# Patient Record
Sex: FEMALE | Race: NATIVE HAWAIIAN OR OTHER PACIFIC ISLANDER | HISPANIC OR LATINO | URBAN - METROPOLITAN AREA
[De-identification: names, ages, dates, MRNs, and addresses within clinical notes are randomized per-mention and may not be internally consistent; named-entity substitution may affect disease eponyms.]

---

## 2020-09-16 ENCOUNTER — EMERGENCY (EMERGENCY)
Facility: HOSPITAL | Age: 30
LOS: 1 days | Discharge: HOME | End: 2020-09-16
Attending: EMERGENCY MEDICINE
Payer: COMMERCIAL

## 2020-09-16 VITALS
WEIGHT: 233.03 LBS | RESPIRATION RATE: 18 BRPM | OXYGEN SATURATION: 95 % | TEMPERATURE: 100 F | DIASTOLIC BLOOD PRESSURE: 85 MMHG | SYSTOLIC BLOOD PRESSURE: 125 MMHG | HEIGHT: 61.42 IN | HEART RATE: 76 BPM

## 2020-09-16 PROCEDURE — 96374 THER/PROPH/DIAG INJ IV PUSH: CPT

## 2020-09-16 PROCEDURE — T1013: CPT

## 2020-09-16 PROCEDURE — 99283 EMERGENCY DEPT VISIT LOW MDM: CPT | Mod: 25

## 2020-09-16 PROCEDURE — 96375 TX/PRO/DX INJ NEW DRUG ADDON: CPT

## 2020-09-16 PROCEDURE — 96372 THER/PROPH/DIAG INJ SC/IM: CPT | Mod: XU

## 2020-09-16 PROCEDURE — 99284 EMERGENCY DEPT VISIT MOD MDM: CPT

## 2020-09-16 PROCEDURE — 99284 EMERGENCY DEPT VISIT MOD MDM: CPT | Mod: 25

## 2020-09-16 RX ORDER — FAMOTIDINE 10 MG/ML
20 INJECTION INTRAVENOUS ONCE
Refills: 0 | Status: COMPLETED | OUTPATIENT
Start: 2020-09-16 | End: 2020-09-16

## 2020-09-16 RX ORDER — DIPHENHYDRAMINE HCL 50 MG
25 CAPSULE ORAL ONCE
Refills: 0 | Status: COMPLETED | OUTPATIENT
Start: 2020-09-16 | End: 2020-09-16

## 2020-09-16 RX ORDER — DIPHENHYDRAMINE HCL 50 MG
50 CAPSULE ORAL ONCE
Refills: 0 | Status: COMPLETED | OUTPATIENT
Start: 2020-09-16 | End: 2020-09-16

## 2020-09-16 RX ADMIN — Medication 50 MILLIGRAM(S): at 22:50

## 2020-09-16 RX ADMIN — FAMOTIDINE 20 MILLIGRAM(S): 10 INJECTION INTRAVENOUS at 21:35

## 2020-09-16 RX ADMIN — Medication 60 MILLIGRAM(S): at 21:35

## 2020-09-16 RX ADMIN — Medication 30 MILLILITER(S): at 22:50

## 2020-09-16 RX ADMIN — Medication 25 MILLIGRAM(S): at 21:35

## 2020-09-16 NOTE — ED ADULT NURSE NOTE - NSIMPLEMENTINTERV_GEN_ALL_ED
Gabapentin called into pharmacy 9/4/19
Pt is requesting refill  .   Requested Prescriptions     Pending Prescriptions Disp Refills    gabapentin (NEURONTIN) 100 mg capsule [Pharmacy Med Name: GABAPENTIN 100MG CAPSULES] 90 Cap 0     Sig: TAKE 1 CAPSULE BY MOUTH EVERY NIGHT 2 HOURS BEFORE BEDTIME    pravastatin (PRAVACHOL) 20 mg tablet [Pharmacy Med Name: PRAVASTATIN 20MG TABLETS] 90 Tab 0     Sig: TAKE 1 TABLET BY MOUTH EVERY EVENING    alendronate (FOSAMAX) 35 mg tablet 4 Tab 3     Pharmacy verified
Implemented All Universal Safety Interventions:  [place] to call system. Call bell, personal items and telephone within reach. Instruct patient to call for assistance. Room bathroom lighting operational. Non-slip footwear when patient is off stretcher. Physically safe environment: no spills, clutter or unnecessary equipment. Stretcher in lowest position, wheels locked, appropriate side rails in place.

## 2020-09-16 NOTE — ED ADULT TRIAGE NOTE - CHIEF COMPLAINT QUOTE
as per patient, she started having nausea, dizziness, diarrhea and body rash and itching. Pt with erythema and hives to abdomen. denies throat itching or swelling, denies difficulty breathing.

## 2020-09-16 NOTE — ED PROVIDER NOTE - ATTENDING CONTRIBUTION TO CARE
Pt. awake and alert. Airway intact. Lungs CTA b/l. Skin-Diffuse rash noted to chest/abdomen/back. Hives appearing to area of skin noted. Pt. in no distress. I, Dr. Puri, performed a face to face bedside interview with this patient regarding history of present illness, review of symptoms and relevant past medical, social and family history.  I completed an independent physical examination.  I have also reviewed the ACP's note(s) and discussed the plan with the ACP.

## 2020-09-16 NOTE — ED PROVIDER NOTE - PATIENT PORTAL LINK FT
You can access the FollowMyHealth Patient Portal offered by [facility] by registering at the following website: [email or web address]. By joining [employer]’s FollowMyHealth portal, you will also be able to view your health information using other applications (apps) compatible with our system.

## 2020-09-16 NOTE — ED PROVIDER NOTE - CLINICAL SUMMARY MEDICAL DECISION MAKING FREE TEXT BOX
31 yo female PMHx hypothyroidism presents to ED c/o urticaria x1 hour. No known allergies. No new food/detergents. No airway involvements, lungs CTAB. Plan for meds, reassess, allergist follow up.

## 2020-09-16 NOTE — ED PROVIDER NOTE - NSFOLLOWUPINSTRUCTIONS_ED_ALL_ED_FT
- Prescription sent to pharmacy.  - Benadryl 25-50mg every 6-8 hours for at least 2 days.  - Pepcid 20mg daily for at least 2 days.  - Please bring all documentation from your ED visit to any related future follow up appointment.  - Please call to schedule follow up appointment with your primary care physician within 24-48 hours.  - Please seek immediate medical attention for any new/worsening, signs/symptoms, or concerns.    Feel better!       - Receta enviada a farmacia.  - Benadryl 25-50 mg cada 6-8 horas jeffry al menos 2 días.  - Pepcid 20 mg al día jeffry al menos 2 días.  - Traiga toda la documentación de powell visita al servicio de urgencias a cualquier sofie de seguimiento futura relacionada.  - Llame para programar victoria sofie de seguimiento con powell médico de atención primaria dentro de las 24 a 48 horas.  - Busque atención médica inmediata ante cualquier nuevo / empeoramiento, signos / síntomas o inquietudes.    ¡Sentirse mejor!

## 2020-09-16 NOTE — ED PROVIDER NOTE - OBJECTIVE STATEMENT
Regian. 29 yo female PMHx hypothyroidism presents to ED c/o rash x1 hour. Self medicated with "allergy medication" (doesn't know name) without relief. Patient last ate rice, chicken, canned corn and peas which is normal diet for patient. Patient had three episodes of diarrhea today. No further complaints at this time.   Denies allergies, new foods/detergents, dizziness, oropharyngeal swelling, difficulty breathing, shortness of breathe, n/v.

## 2020-09-16 NOTE — ED PROVIDER NOTE - CARE PROVIDER_API CALL
Adis Lopez [id number] [place] [address]  Phone: [phone]  Fax: [phone]  Follow Up Time:

## 2020-09-16 NOTE — ED PROVIDER NOTE - PHYSICAL EXAMINATION
General: A&Ox3, in NAD, non-toxic appearing; well nourished/developed. Phonation normal.  Skin: Warm, dry, color normal for race. Diffuse urticaria to anterior/posterior trunk and neck. Single urticaria to b/l arms and legs.   Eyes: Sclera anicteric, conjunctivae clear b/l. No discharge. PERRLA, EOMI.  Throat: Airway patent. Tolerating secretions, no drooling or trismus. No lip or tongue swelling. No oorpharygneral swelling.   Neck: Supple.   Cardio: No lifts, heaves, visible pulsations. No thrills. Rate and rhythm regular, S1 & S2 clear. No audible murmur, gallop, or rub.  Resp: Speaking in full sentences. No visible nasal flaring, retractions, or deformity. Normal AP to lateral diameter, symmetrical excursion b/l. Breath sounds vesicular, symmetrical and without rales, rhonchi or wheezing b/l.  Abd: Non-distended. Soft, non-tender, no masses palpated. No rebound, guarding.

## 2020-09-16 NOTE — ED PROVIDER NOTE - CROS ED RESP ALL NEG
Skin Complaint HPI





- HPI Summary


HPI Summary: 





Pt presents to  with mom. Pt healthy without recent sickness. Today mom 

brought to school - child washed hands. Shortly after noted to have edema and 

swelling, hives and itching  to bilateral hands and forearm.  Mom got call from 

school RN. Sx have nearly resolved since mom picked up. no facial swelling, 

lesions. no shortness of breath. no other complaints. Mom denies new foods 

cloths, detergents. Pt withotu any complaints. no treatment given 





immunizaitons UTD


medications reviewed





- History of Current Complaint


Chief Complaint: UCSkin


Time Seen by Provider: 04/24/19 12:04


Stated Complaint: BILATERAL HAND/LEFT CHEEK - SKIN


Hx Obtained From: Patient


Pregnant?: No


Onset/Duration: Gradual Onset


Pain Intensity: 0





- Allergy/Home Medications


Allergies/Adverse Reactions: 


 Allergies











Allergy/AdvReac Type Severity Reaction Status Date / Time


 


No Known Allergies Allergy   Verified 04/24/19 11:43














PMH/Surg Hx/FS Hx/Imm Hx


Previously Healthy: Yes





- Surgical History


Surgical History: None





- Family History


Known Family History: Positive: Cardiac Disease, Non-Contributory





- Social History


Occupation: Student


Lives: With Family


Alcohol Use: None


Smoking Status (MU): Never Smoked Tobacco





- Immunization History


Vaccination Up to Date: Yes





Review of Systems


All Other Systems Reviewed And Are Negative: Yes


Constitutional: Positive: Negative


Skin: Positive: Rash





Physical Exam





- Summary


Physical Exam Summary: 





Vital Signs Reviewed: Yes


A+Ox3, no distress, age appropriate


Eyes: Conjunctiva Clear, WILL. EOM intact and full


ENT: Hearing grossly normal  TM x 2 clear, mmoist, uvula midline, no exudate, 

no erythema


Neck: Positive: Supple


Respiratory: Positive: No respiratory distress, No accessory muscle use + CTA 

throughout  no w/r


Cardiovascular: RRR  nl s1, s2  no m/r  CBT <2  sec


abd soft + BS nt/nd  no guarding, no distension


Musculoskeletal Exam: JACOBSON x 4 without difficulty Strength Intact, ROM Intact


Neurological: Positive: Alert,  + sensation throughout


Psychological: Positive: Normal Response To Family


Skin: Positive: no hives mild erythema to fingers ob bilateral hands. minimal 

edema. no hives. no itching. nolesions elsewhere - mom reports near resolution


Triage Information Reviewed: Yes


Vital Signs: 


 Initial Vital Signs











Temp  99.4 F   04/24/19 11:43


 


Pulse  119   04/24/19 11:43


 


Resp  22   04/24/19 11:43


 


BP  104/45   04/24/19 11:43


 


Pulse Ox  98   04/24/19 11:43














Course/Dx





- Course


Course Of Treatment: 





Pt presents after developing hives, swelling and reddness to bilateral hands 

and midforearm at school this am after washing hands. No tx renderned - nearly 

resolved


VSS


pt well appearing, minimal erythema, edema fingers b/l 


I had long conversation with mom - unclear cause - sx improving


recommend cold soaks


reviewed benadryl dosing as required 


strict return precautions


no treatment at present


mom comfortable and in agreement with plan - will watch closely at home today





- Diagnoses


Provider Diagnosis: 


 Localized swelling on hand








Discharge





- Sign-Out/Discharge


Documenting (check all that apply): Patient Departure


All imaging exams completed and their final reports reviewed: No Studies





- Discharge Plan


Condition: Stable


Disposition: HOME


Patient Education Materials:  Acute Rash (ED)


Referrals: 


Chitra Zazueta MD [Primary Care Provider] - 


Additional Instructions: 


The doctor that evaluated you today is not sure the cause of your rash - it may 

be related to something you touched. 


The following is recommended: 


- avoid getting over heated over the next 24 hours (hot water, showers, running)


- If your symptoms return or worsen - okay to take Benadryl 12.5mg 


- If you have any changes in your breathing rapid progression of reddness, 

swelling, hives,s welling or  any other concerns it is recommended you go to 

the emergency department for reassessment 








- Billing Disposition and Condition


Condition: STABLE


Disposition: Home
negative...

## 2020-09-17 ENCOUNTER — EMERGENCY (EMERGENCY)
Facility: HOSPITAL | Age: 30
LOS: 1 days | Discharge: HOME | End: 2020-09-17
Attending: EMERGENCY MEDICINE
Payer: COMMERCIAL

## 2020-09-17 VITALS
HEART RATE: 95 BPM | OXYGEN SATURATION: 96 % | HEIGHT: 61.42 IN | DIASTOLIC BLOOD PRESSURE: 75 MMHG | WEIGHT: 233.03 LBS | SYSTOLIC BLOOD PRESSURE: 121 MMHG | TEMPERATURE: 98 F | RESPIRATION RATE: 18 BRPM

## 2020-09-17 VITALS
HEART RATE: 106 BPM | OXYGEN SATURATION: 99 % | SYSTOLIC BLOOD PRESSURE: 140 MMHG | DIASTOLIC BLOOD PRESSURE: 62 MMHG | RESPIRATION RATE: 18 BRPM

## 2020-09-17 PROCEDURE — 96372 THER/PROPH/DIAG INJ SC/IM: CPT

## 2020-09-17 PROCEDURE — 99284 EMERGENCY DEPT VISIT MOD MDM: CPT | Mod: 25

## 2020-09-17 PROCEDURE — 99284 EMERGENCY DEPT VISIT MOD MDM: CPT

## 2020-09-17 PROCEDURE — T1013: CPT

## 2020-09-17 RX ORDER — DIPHENHYDRAMINE HCL 50 MG
50 CAPSULE ORAL ONCE
Refills: 0 | Status: COMPLETED | OUTPATIENT
Start: 2020-09-17 | End: 2020-09-17

## 2020-09-17 RX ORDER — EPINEPHRINE 0.3 MG/.3ML
0.3 INJECTION INTRAMUSCULAR; SUBCUTANEOUS
Qty: 1 | Refills: 0
Start: 2020-09-17 | End: 2020-09-17

## 2020-09-17 RX ORDER — FAMOTIDINE 10 MG/ML
20 INJECTION INTRAVENOUS DAILY
Refills: 0 | Status: DISCONTINUED | OUTPATIENT
Start: 2020-09-17 | End: 2020-09-22

## 2020-09-17 RX ORDER — EPINEPHRINE 0.3 MG/.3ML
0.3 INJECTION INTRAMUSCULAR; SUBCUTANEOUS ONCE
Refills: 0 | Status: COMPLETED | OUTPATIENT
Start: 2020-09-17 | End: 2020-09-17

## 2020-09-17 RX ORDER — EPINEPHRINE 0.3 MG/.3ML
1 INJECTION INTRAMUSCULAR; SUBCUTANEOUS
Qty: 1 | Refills: 0
Start: 2020-09-17

## 2020-09-17 RX ORDER — HYDROXYZINE HCL 10 MG
25 TABLET ORAL ONCE
Refills: 0 | Status: COMPLETED | OUTPATIENT
Start: 2020-09-17 | End: 2020-09-17

## 2020-09-17 RX ADMIN — Medication 25 MILLIGRAM(S): at 21:51

## 2020-09-17 RX ADMIN — EPINEPHRINE 0.3 MILLIGRAM(S): 0.3 INJECTION INTRAMUSCULAR; SUBCUTANEOUS at 17:18

## 2020-09-17 RX ADMIN — Medication 50 MILLIGRAM(S): at 18:34

## 2020-09-17 RX ADMIN — FAMOTIDINE 20 MILLIGRAM(S): 10 INJECTION INTRAVENOUS at 17:18

## 2020-09-17 NOTE — ED PROVIDER NOTE - PROGRESS NOTE DETAILS
pts rash has improved pt is no longer itchy, feels much better will dc home with her to continue regimen and will prescribe an epi pen for home.

## 2020-09-17 NOTE — ED ADULT NURSE REASSESSMENT NOTE - NS ED NURSE REASSESS COMMENT FT1
Assumed care from previous RN. Plan of care reviewed. C/o rash of unknown cause. Airway intact, no swelling noted. Pink, raised generalized rash noted. Pt alert and oriented x3, respirations even and unlabored, skin warm and dry, color appropriate for ethnicity, speech clear, moving extremities. Remains on cm and . Updated pt on plan of care. Will continue to monitor.

## 2020-09-17 NOTE — ED PROVIDER NOTE - ENMT, MLM
Airway patent, Nasal mucosa clear. Mouth with normal mucosa. Throat has no vesicles, no oropharyngeal exudates and uvula is midline. No lip or throat swelling. No drooling.

## 2020-09-17 NOTE — ED PROVIDER NOTE - CLINICAL SUMMARY MEDICAL DECISION MAKING FREE TEXT BOX
31 y/o F with worsening urticaria, diarrhea and c/o slow breathing but CTA b/l. Pepcid 20 mg given, Epi 0.3 mg IM given. cardiac bedside monitor. will observe for 4 hours.

## 2020-09-17 NOTE — ED PROVIDER NOTE - NSFOLLOWUPINSTRUCTIONS_ED_ALL_ED_FT
Allergic Reaction    An allergic reaction is an abnormal reaction to a substance (allergen) by the body's defense system. Common allergens include medicines, food, insect bites or stings, and blood products. The body releases certain proteins into the blood that can cause a variety of symptoms such as an itchy rash, wheezing, swelling of the face/lips/tongue/throat, abdominal pain, nausea or vomiting. An allergic reaction is usually treated with medication. If your health care provider prescribed you an epinephrine injection device, make sure to keep it with you at all times.    SEEK IMMEDIATE MEDICAL CARE IF YOU HAVE ANY OF THE FOLLOWING SYMPTOMS: allergic reaction severe enough that required you to use epinephrine, tightness in your chest, swelling around your lips/tongue/throat, abdominal pain, vomiting or diarrhea, or lightheadedness/dizziness. These symptoms may represent a serious problem that is an emergency. Do not wait to see if the symptoms will go away. Use your auto-injector pen or anaphylaxis kit as you have been instructed. Call 911 and do not drive yourself to the hospital.   Continue with Benadryl Allergic Reaction    An allergic reaction is an abnormal reaction to a substance (allergen) by the body's defense system. Common allergens include medicines, food, insect bites or stings, and blood products. The body releases certain proteins into the blood that can cause a variety of symptoms such as an itchy rash, wheezing, swelling of the face/lips/tongue/throat, abdominal pain, nausea or vomiting. An allergic reaction is usually treated with medication. If your health care provider prescribed you an epinephrine injection device, make sure to keep it with you at all times.    SEEK IMMEDIATE MEDICAL CARE IF YOU HAVE ANY OF THE FOLLOWING SYMPTOMS: allergic reaction severe enough that required you to use epinephrine, tightness in your chest, swelling around your lips/tongue/throat, abdominal pain, vomiting or diarrhea, or lightheadedness/dizziness. These symptoms may represent a serious problem that is an emergency. Do not wait to see if the symptoms will go away. Use your auto-injector pen or anaphylaxis kit as you have been instructed. Call 911 and do not drive yourself to the hospital.     Continue with Benadryl 25 - 50 mg every 6 to 8 hours x 3 days  Continue with Pepcid 20 mg a day x 3 days  Continue Prednisone 40 mg x 3 days or until you finish prescribed dose amount  Follow up with allergist Dr Adis Lopez

## 2020-09-17 NOTE — ED ADULT NURSE NOTE - CHIEF COMPLAINT QUOTE
pt with c/o rash, itching, feeling hot flushed to chest since yesterday. took benadryl 50mg this morning and again around 2pm with no improvement.

## 2020-09-17 NOTE — ED PROVIDER NOTE - ATTENDING CONTRIBUTION TO CARE
Dc: I performed a face to face bedside interview with patient regarding history of present illness, review of symptoms and past medical history. I completed an independent physical exam.  I have discussed patient's plan of care with advanced care provider.   I agree with note as stated above including HISTORY OF PRESENT ILLNESS, HIV, PAST MEDICAL/SURGICAL/FAMILY/SOCIAL HISTORY, ALLERGIES AND HOME MEDICATIONS, REVIEW OF SYSTEMS, PHYSICAL EXAM, MEDICAL DECISION MAKING and any PROGRESS NOTES during the time I functioned as the attending physician for this patient  unless otherwise noted. My brief assessment is as follows: 30F p/w pruritic rash. Seen yesterday and started on prednisonce, benadryl, pepcid for allergic reaction. States that the rash is getting worse. +diarrhea. Denies new exposures. Denies SOB, mouth/tongue swelling, CP.   Exam notable for diffuse urticaria. No oral edema, no stridor, no wheezing, no abd ttp.   Patient already on prednisone/benadryl/pepcid. Will give epinephrine IM, benadryl prn, and reassess.

## 2020-09-17 NOTE — ED PROVIDER NOTE - PATIENT PORTAL LINK FT
You can access the FollowMyHealth Patient Portal offered by [facility] by registering at the following website: [email or web address]. By joining [employer]’s FollowMyHealth portal, you will also be able to view your health information using other applications (apps) compatible with our system.

## 2020-09-17 NOTE — ED PROVIDER NOTE - RESPIRATORY, MLM
Breath sounds clear and equal bilaterally. No stridor. speaking in full sentences, not gasping no accessory muscles usage

## 2020-09-17 NOTE — ED ADULT TRIAGE NOTE - CHIEF COMPLAINT QUOTE
pt with c/o rash, itching, feeling hot flushed to chest since yesterday. took benadryl 50mg this morning and again around 2pm with no improvement.
No/received vaccine 2017

## 2020-09-17 NOTE — ED PROVIDER NOTE - CARE PROVIDER_API CALL
Adis Lopez [id number] [place] [address]  Phone: [phone]  Fax: [phone]  Follow Up Time: 1-3 Days

## 2020-09-17 NOTE — ED PROVIDER NOTE - OBJECTIVE STATEMENT
31 y/o F with pmhx of hypothyroidism presents to ED with worsening allergic reaction. Pt was here yesterday for rash of unknown origin and was treated with Maalox, Benadryl, pepcid and prednisone. Pt states she felt better when leaving but this morning rash has worsened and experiencing diarrhea and nausea since yesterday.  Pt states she took the prescribed Prednisone 40mg at 2 pm and Benadryl 50 mg at 2 pm with no relief and rash worsening. Pt states her breathing feelings slow. Denies chest pain, lip or throat swelling, vomiting. Denies new food, clothes, bedding, perfume, soaps, detergents, exposure to animals. Lisha used as

## 2020-09-18 ENCOUNTER — EMERGENCY (EMERGENCY)
Facility: HOSPITAL | Age: 30
LOS: 1 days | Discharge: HOME | End: 2020-09-18
Attending: STUDENT IN AN ORGANIZED HEALTH CARE EDUCATION/TRAINING PROGRAM
Payer: COMMERCIAL

## 2020-09-18 VITALS
HEART RATE: 99 BPM | DIASTOLIC BLOOD PRESSURE: 65 MMHG | SYSTOLIC BLOOD PRESSURE: 132 MMHG | OXYGEN SATURATION: 98 % | RESPIRATION RATE: 16 BRPM

## 2020-09-18 VITALS
WEIGHT: 233.03 LBS | SYSTOLIC BLOOD PRESSURE: 128 MMHG | DIASTOLIC BLOOD PRESSURE: 88 MMHG | HEART RATE: 111 BPM | HEIGHT: 61.42 IN | TEMPERATURE: 98 F | RESPIRATION RATE: 20 BRPM | OXYGEN SATURATION: 96 %

## 2020-09-18 PROCEDURE — 99284 EMERGENCY DEPT VISIT MOD MDM: CPT

## 2020-09-18 PROCEDURE — T1013: CPT

## 2020-09-18 RX ORDER — FAMOTIDINE 10 MG/ML
20 INJECTION INTRAVENOUS ONCE
Refills: 0 | Status: COMPLETED | OUTPATIENT
Start: 2020-09-18 | End: 2020-09-18

## 2020-09-18 RX ORDER — MONTELUKAST 4 MG/1
10 TABLET, CHEWABLE ORAL ONCE
Refills: 0 | Status: COMPLETED | OUTPATIENT
Start: 2020-09-18 | End: 2020-09-18

## 2020-09-18 RX ORDER — SODIUM CHLORIDE 9 MG/ML
1000 INJECTION INTRAMUSCULAR; INTRAVENOUS; SUBCUTANEOUS ONCE
Refills: 0 | Status: COMPLETED | OUTPATIENT
Start: 2020-09-18 | End: 2020-09-18

## 2020-09-18 RX ORDER — DIPHENHYDRAMINE HCL 50 MG
50 CAPSULE ORAL ONCE
Refills: 0 | Status: COMPLETED | OUTPATIENT
Start: 2020-09-18 | End: 2020-09-18

## 2020-09-18 RX ORDER — FAMOTIDINE 10 MG/ML
1 INJECTION INTRAVENOUS
Qty: 7 | Refills: 0
Start: 2020-09-18 | End: 2020-09-24

## 2020-09-18 RX ORDER — DIPHENHYDRAMINE HCL 50 MG
1 CAPSULE ORAL
Qty: 30 | Refills: 0
Start: 2020-09-18 | End: 2020-09-22

## 2020-09-18 RX ORDER — HYDROXYZINE HCL 10 MG
25 TABLET ORAL ONCE
Refills: 0 | Status: COMPLETED | OUTPATIENT
Start: 2020-09-18 | End: 2020-09-18

## 2020-09-18 RX ORDER — EPINEPHRINE 0.3 MG/.3ML
0.3 INJECTION INTRAMUSCULAR; SUBCUTANEOUS ONCE
Refills: 0 | Status: COMPLETED | OUTPATIENT
Start: 2020-09-18 | End: 2020-09-18

## 2020-09-18 RX ADMIN — MONTELUKAST 10 MILLIGRAM(S): 4 TABLET, CHEWABLE ORAL at 17:52

## 2020-09-18 RX ADMIN — Medication 25 MILLIGRAM(S): at 17:51

## 2020-09-18 RX ADMIN — SODIUM CHLORIDE 1000 MILLILITER(S): 9 INJECTION INTRAMUSCULAR; INTRAVENOUS; SUBCUTANEOUS at 16:04

## 2020-09-18 RX ADMIN — FAMOTIDINE 20 MILLIGRAM(S): 10 INJECTION INTRAVENOUS at 16:04

## 2020-09-18 RX ADMIN — Medication 50 MILLIGRAM(S): at 16:04

## 2020-09-18 RX ADMIN — EPINEPHRINE 0.3 MILLIGRAM(S): 0.3 INJECTION INTRAMUSCULAR; SUBCUTANEOUS at 18:56

## 2020-09-18 RX ADMIN — Medication 60 MILLIGRAM(S): at 16:04

## 2020-09-18 NOTE — ED STATDOCS - PROGRESS NOTE DETAILS
NP NOTE:  Charting reviewed, patient taking medications correctly, given doses here without any relief of itch or rash.  Not having any difficulty breathing or swallowing.  Will give atarax and singulair, reevaluate. NP NOTE:  No improvement in diffuse hive rash or itch.  Will give epinephrine and monitor. TAHIR Love: Received sign out from ROXANA Lara pending observing s/p epi and re-assessment. Pt noting slight improvement of symptoms at this time, rash still present diffusely. No s/s anaphylaxis. TAHIR Love: Received sign out from ROXANA Lara pending observing s/p epi and re-assessment. Pt noting slight improvement of symptoms at this time, rash still present diffusely. No s/s anaphylaxis.  : Regina Siddiqui Pt observed for >3 hours in ED, no worsening of symptoms. VSS, well appearing. pt given rx for new medications and referral info for derm. Return precautions given. Stable for dc  : Regina Siddiqui

## 2020-09-18 NOTE — ED STATDOCS - PATIENT PORTAL LINK FT
You can access the FollowMyHealth Patient Portal offered by [facility] by registering at the following website: [email or web address]. By joining [employer]’s FollowMyHealth portal, you will also be able to view your health information using other applications (apps) compatible with our system.

## 2020-09-18 NOTE — ED STATDOCS - ATTENDING CONTRIBUTION TO CARE
30 yof pmh hypothyroid day 3 of urticaria. Unclear allergen. third visit here to ED. taking steroids and benadryl as prescribed. no uvular swelling or airway involvement. diffuse urticaria throughout body. will treat as allergic rxn. monitor and reassess

## 2020-09-18 NOTE — ED STATDOCS - OBJECTIVE STATEMENT
29 y/o F pt with significant PMHx of hypothyroidism presents to the ED c/o rash throughout her body that worsened this morning after taking Benadryl. Has been here for the past 2 days with same sx. Reports her tongue is hurting today. Believes she is having an allergic reaction. Not aware of current allergies; never had an allergic rxn before a couple days ago. States that the current rash is worse than her previous allergic reactions. Has been taking a steroid 1x per day without significant relief. Had epinephrine here last time. Takes thyroid meds but denies other meds. Denies smoking. Denies surgeries. No further complaints at this time. 31 y/o F pt with significant PMHx of hypothyroidism presents to the ED c/o rash throughout her body that worsened this morning after taking Benadryl. Has been here for the past 2 days with same sx. Reports her tongue is hurting today. Believes she is having an allergic reaction. Not aware of current allergies; never had an allergic rxn before a couple days ago. States that the current rash is worse than her previous allergic reactions. Has been taking a steroid 1x per day without significant relief, benadryl and pepcid. Had epinephrine here last time. Takes thyroid meds but denies other meds. Denies smoking. Denies surgeries. No further complaints at this time.

## 2020-09-18 NOTE — ED STATDOCS - CARE PROVIDER_API CALL
Cristina Maddox  DERMATOLOGY  [address] [place] [address]  Phone: [phone]  Fax: [phone]  Follow Up Time:     Lakeisha Rouse  DERMATOLOGY  [address] [facility] [address]  [place] [address]  Phone: [phone]  Fax: [phone]  Follow Up Time:     Adis Lopez [facility]  [address] [place] [address]  Phone: [phone]  Fax: [phone]  Follow Up Time:

## 2020-09-18 NOTE — ED STATDOCS - ENMT, MLM
Nasal mucosa clear.  Mouth with normal mucosa  Throat has no vesicles, no oropharyngeal exudates and uvula is midline. No uvular swelling and no tonsillar swelling.

## 2020-09-18 NOTE — ED STATDOCS - PROVIDER TOKENS
PROVIDER:[TOKEN:[[id number]]],PROVIDER:[TOKEN:[[id number]]],PROVIDER:[TOKEN:[[id number]]]

## 2020-09-18 NOTE — ED STATDOCS - NSFOLLOWUPINSTRUCTIONS_ED_ALL_ED_FT
- Follow up with your doctor within 2-3 days.   - Return to the ED for any new or worsening symptoms.   - Follow-up with dermatologist or allergist within 1 week for further evaluation  - Continue current medications for rash/itching    Rash    A rash is a change in the color of the skin. A rash can also change the way your skin feels. There are many different conditions and factors that can cause a rash, most of which are not dangerous. Make sure to follow up with your primary care physician or a dermatologist as instructed by your health care provider.    SEEK IMMEDIATE MEDICAL CARE IF YOU HAVE ANY OF THE FOLLOWING SYMPTOMS: fever, blisters, a rash inside your mouth, vaginal or anal pain, or altered mental status.     - Amaya un seguimiento con powell médico dentro de 2-3 días.  - Regrese al servicio de urgencias por cualquier síntoma nuevo o que empeore.  - Seguimiento con dermatólogo o alergólogo dentro de 1 semana para violeta evaluación adicional  - Continuar con los medicamentos actuales para el sarpullido / picazón    Erupción    Violeta erupción es un cambio en el color de la piel. Violeta erupción también puede cambiar la forma en que se siente la piel. Hay muchas condiciones y factores diferentes que pueden causar violeta erupción, la mayoría de los cuales no son peligrosos. Asegúrese de hacer un seguimiento con powell médico de atención primaria o un dermatólogo según las instrucciones de powell proveedor de atención médica.    BUSQUE ATENCIÓN MÉDICA INMEDIATA SI TIENE ALGUNO DE LOS SIGUIENTES SÍNTOMAS: fiebre, ampollas, sarpullido dentro de la boca, dolor vaginal o anal o alteración del estado mental.

## 2020-09-18 NOTE — ED STATDOCS - CLINICAL SUMMARY MEDICAL DECISION MAKING FREE TEXT BOX
3rd visit in 3 days for recurrent diffuse body urticaria. Has been taking steroids. No airway involvement. Will treat with Benadryl, Pepcid, monitor, and reassess. 3rd visit in 3 days for recurrent diffuse body urticaria. Has been taking steroids. No airway involvement. patient with epipen at home. unclear allergen, has not been able to see allergist yet. Will treat with Benadryl, Pepcid, monitor, and reassess.

## 2020-09-18 NOTE — ED ADULT TRIAGE NOTE - CHIEF COMPLAINT QUOTE
Pt arrives to ED c/o generalized body hives from unknown allergen, symtoms started three days ago  c/o slight itchiness in  the throat , breathing easy and unlabored

## 2020-09-18 NOTE — ED ADULT NURSE REASSESSMENT NOTE - NS ED NURSE REASSESS COMMENT FT1
Assumed pt. care at 1930. Pt. A&ox3. Pt. respirations even and unlabored. Pt. in no apparent distress. Pt. has hives on her body. Pt. states the itchiness has not gotten better. Pt. denies any respiratory complications.

## 2020-09-18 NOTE — ED ADULT NURSE NOTE - OBJECTIVE STATEMENT
Pt arrives with hives to total body that she states are itchy. Pt used a new cleaning product to wash pots at home and this happened afterwards. Pt denies any difficulty breathing, throat tightness, diff swallowing.

## 2020-09-19 ENCOUNTER — APPOINTMENT (OUTPATIENT)
Dept: DERMATOLOGY | Facility: CLINIC | Age: 30
End: 2020-09-19
Payer: COMMERCIAL

## 2020-09-19 PROBLEM — E03.9 HYPOTHYROIDISM, UNSPECIFIED: Chronic | Status: ACTIVE | Noted: 2020-09-16

## 2020-09-19 PROCEDURE — 99202 OFFICE O/P NEW SF 15 MIN: CPT

## 2020-09-20 ENCOUNTER — EMERGENCY (EMERGENCY)
Facility: HOSPITAL | Age: 30
LOS: 1 days | Discharge: HOME | End: 2020-09-20
Attending: EMERGENCY MEDICINE
Payer: COMMERCIAL

## 2020-09-20 VITALS
SYSTOLIC BLOOD PRESSURE: 115 MMHG | DIASTOLIC BLOOD PRESSURE: 78 MMHG | RESPIRATION RATE: 20 BRPM | HEIGHT: 61.42 IN | HEART RATE: 100 BPM | OXYGEN SATURATION: 98 % | TEMPERATURE: 98 F | WEIGHT: 229.94 LBS

## 2020-09-20 VITALS
RESPIRATION RATE: 22 BRPM | DIASTOLIC BLOOD PRESSURE: 55 MMHG | OXYGEN SATURATION: 93 % | HEART RATE: 110 BPM | SYSTOLIC BLOOD PRESSURE: 112 MMHG

## 2020-09-20 LAB
ALBUMIN SERPL ELPH-MCNC: 3.7 G/DL — SIGNIFICANT CHANGE UP (ref 3.3–5.2)
ALP SERPL-CCNC: 61 U/L — SIGNIFICANT CHANGE UP (ref 40–120)
ALT FLD-CCNC: 17 U/L — SIGNIFICANT CHANGE UP
ANION GAP SERPL CALC-SCNC: 12 MMOL/L — SIGNIFICANT CHANGE UP (ref 5–17)
AST SERPL-CCNC: 39 U/L — HIGH
BASOPHILS # BLD AUTO: 0.05 K/UL — SIGNIFICANT CHANGE UP (ref 0–0.2)
BASOPHILS NFR BLD AUTO: 0.3 % — SIGNIFICANT CHANGE UP (ref 0–2)
BILIRUB SERPL-MCNC: 0.6 MG/DL — SIGNIFICANT CHANGE UP (ref 0.4–2)
BUN SERPL-MCNC: 6 MG/DL — LOW (ref 8–20)
CALCIUM SERPL-MCNC: 8.9 MG/DL — SIGNIFICANT CHANGE UP (ref 8.6–10.2)
CHLORIDE SERPL-SCNC: 101 MMOL/L — SIGNIFICANT CHANGE UP (ref 98–107)
CK SERPL-CCNC: 106 U/L — SIGNIFICANT CHANGE UP (ref 25–170)
CO2 SERPL-SCNC: 24 MMOL/L — SIGNIFICANT CHANGE UP (ref 22–29)
CREAT SERPL-MCNC: 0.55 MG/DL — SIGNIFICANT CHANGE UP (ref 0.5–1.3)
EOSINOPHIL # BLD AUTO: 0.02 K/UL — SIGNIFICANT CHANGE UP (ref 0–0.5)
EOSINOPHIL NFR BLD AUTO: 0.1 % — SIGNIFICANT CHANGE UP (ref 0–6)
GLUCOSE SERPL-MCNC: 130 MG/DL — HIGH (ref 70–99)
HCG SERPL-ACNC: <4 MIU/ML — SIGNIFICANT CHANGE UP
HCT VFR BLD CALC: 44.5 % — SIGNIFICANT CHANGE UP (ref 34.5–45)
HGB BLD-MCNC: 14.9 G/DL — SIGNIFICANT CHANGE UP (ref 11.5–15.5)
IMM GRANULOCYTES NFR BLD AUTO: 0.7 % — SIGNIFICANT CHANGE UP (ref 0–1.5)
LYMPHOCYTES # BLD AUTO: 1.11 K/UL — SIGNIFICANT CHANGE UP (ref 1–3.3)
LYMPHOCYTES # BLD AUTO: 6.6 % — LOW (ref 13–44)
MCHC RBC-ENTMCNC: 30.5 PG — SIGNIFICANT CHANGE UP (ref 27–34)
MCHC RBC-ENTMCNC: 33.5 GM/DL — SIGNIFICANT CHANGE UP (ref 32–36)
MCV RBC AUTO: 91.2 FL — SIGNIFICANT CHANGE UP (ref 80–100)
MONOCYTES # BLD AUTO: 0.27 K/UL — SIGNIFICANT CHANGE UP (ref 0–0.9)
MONOCYTES NFR BLD AUTO: 1.6 % — LOW (ref 2–14)
NEUTROPHILS # BLD AUTO: 15.36 K/UL — HIGH (ref 1.8–7.4)
NEUTROPHILS NFR BLD AUTO: 90.7 % — HIGH (ref 43–77)
PLATELET # BLD AUTO: 296 K/UL — SIGNIFICANT CHANGE UP (ref 150–400)
POTASSIUM SERPL-MCNC: 5.2 MMOL/L — SIGNIFICANT CHANGE UP (ref 3.5–5.3)
POTASSIUM SERPL-SCNC: 5.2 MMOL/L — SIGNIFICANT CHANGE UP (ref 3.5–5.3)
PROT SERPL-MCNC: 7.2 G/DL — SIGNIFICANT CHANGE UP (ref 6.6–8.7)
RBC # BLD: 4.88 M/UL — SIGNIFICANT CHANGE UP (ref 3.8–5.2)
RBC # FLD: 14 % — SIGNIFICANT CHANGE UP (ref 10.3–14.5)
SODIUM SERPL-SCNC: 137 MMOL/L — SIGNIFICANT CHANGE UP (ref 135–145)
TROPONIN T SERPL-MCNC: <0.01 NG/ML — SIGNIFICANT CHANGE UP (ref 0–0.06)
WBC # BLD: 16.92 K/UL — HIGH (ref 3.8–10.5)
WBC # FLD AUTO: 16.92 K/UL — HIGH (ref 3.8–10.5)

## 2020-09-20 PROCEDURE — 82550 ASSAY OF CK (CPK): CPT

## 2020-09-20 PROCEDURE — 96361 HYDRATE IV INFUSION ADD-ON: CPT

## 2020-09-20 PROCEDURE — 36415 COLL VENOUS BLD VENIPUNCTURE: CPT

## 2020-09-20 PROCEDURE — 99284 EMERGENCY DEPT VISIT MOD MDM: CPT | Mod: 25

## 2020-09-20 PROCEDURE — 84702 CHORIONIC GONADOTROPIN TEST: CPT

## 2020-09-20 PROCEDURE — 96374 THER/PROPH/DIAG INJ IV PUSH: CPT

## 2020-09-20 PROCEDURE — 71045 X-RAY EXAM CHEST 1 VIEW: CPT

## 2020-09-20 PROCEDURE — 96375 TX/PRO/DX INJ NEW DRUG ADDON: CPT

## 2020-09-20 PROCEDURE — 93010 ELECTROCARDIOGRAM REPORT: CPT

## 2020-09-20 PROCEDURE — 93005 ELECTROCARDIOGRAM TRACING: CPT

## 2020-09-20 PROCEDURE — 85025 COMPLETE CBC W/AUTO DIFF WBC: CPT

## 2020-09-20 PROCEDURE — 99285 EMERGENCY DEPT VISIT HI MDM: CPT

## 2020-09-20 PROCEDURE — 96372 THER/PROPH/DIAG INJ SC/IM: CPT | Mod: XU

## 2020-09-20 PROCEDURE — 71045 X-RAY EXAM CHEST 1 VIEW: CPT | Mod: 26

## 2020-09-20 PROCEDURE — T1013: CPT

## 2020-09-20 PROCEDURE — 84484 ASSAY OF TROPONIN QUANT: CPT

## 2020-09-20 PROCEDURE — 80053 COMPREHEN METABOLIC PANEL: CPT

## 2020-09-20 RX ORDER — EPINEPHRINE 0.3 MG/.3ML
0.3 INJECTION INTRAMUSCULAR; SUBCUTANEOUS
Qty: 2 | Refills: 0
Start: 2020-09-20

## 2020-09-20 RX ORDER — EPINEPHRINE 0.3 MG/.3ML
0.3 INJECTION INTRAMUSCULAR; SUBCUTANEOUS ONCE
Refills: 0 | Status: COMPLETED | OUTPATIENT
Start: 2020-09-20 | End: 2020-09-20

## 2020-09-20 RX ORDER — FAMOTIDINE 10 MG/ML
20 INJECTION INTRAVENOUS ONCE
Refills: 0 | Status: COMPLETED | OUTPATIENT
Start: 2020-09-20 | End: 2020-09-20

## 2020-09-20 RX ORDER — SODIUM CHLORIDE 9 MG/ML
3000 INJECTION INTRAMUSCULAR; INTRAVENOUS; SUBCUTANEOUS ONCE
Refills: 0 | Status: COMPLETED | OUTPATIENT
Start: 2020-09-20 | End: 2020-09-20

## 2020-09-20 RX ORDER — FAMOTIDINE 10 MG/ML
1 INJECTION INTRAVENOUS
Qty: 28 | Refills: 0
Start: 2020-09-20 | End: 2020-10-03

## 2020-09-20 RX ADMIN — SODIUM CHLORIDE 3000 MILLILITER(S): 9 INJECTION INTRAMUSCULAR; INTRAVENOUS; SUBCUTANEOUS at 11:21

## 2020-09-20 RX ADMIN — EPINEPHRINE 0.3 MILLIGRAM(S): 0.3 INJECTION INTRAMUSCULAR; SUBCUTANEOUS at 09:15

## 2020-09-20 RX ADMIN — Medication 125 MILLIGRAM(S): at 09:15

## 2020-09-20 RX ADMIN — FAMOTIDINE 20 MILLIGRAM(S): 10 INJECTION INTRAVENOUS at 09:15

## 2020-09-20 RX ADMIN — SODIUM CHLORIDE 3000 MILLILITER(S): 9 INJECTION INTRAMUSCULAR; INTRAVENOUS; SUBCUTANEOUS at 09:15

## 2020-09-20 NOTE — ED PROVIDER NOTE - CLINICAL SUMMARY MEDICAL DECISION MAKING FREE TEXT BOX
Patient's  at bedside, educated that this is likely an allergic reaction. Will give Epi, solumedrol Pepcid, IV fluids, and check labs. Pt concerned about CP will do 1 troponin, also will do CXR

## 2020-09-20 NOTE — ED ADULT TRIAGE NOTE - CHIEF COMPLAINT QUOTE
Patient arrived to ED today with c/o chest pain for three hours, throat pain for three hours, and feels like her throat is going numb for three hours. Patient here on the 9/16, 9/17, 9/18 to be seen in ED.  Patient has rash to body. Patient arrived to ED today with c/o chest pain for three hours, throat pain for three hours, and feels like her throat is going numb for three hours. Patient here on the 9/16, 9/17, 9/18 to be seen in ED with similar symptoms.  Patient has rash to body.

## 2020-09-20 NOTE — ED PROVIDER NOTE - OBJECTIVE STATEMENT
31 y/o female with PMHx of Hypothyroidism presents with to ED c/o chest pain. Patient reports diffuse rash all over body for a few days, and new onset chest and throat tightness that started today. Has new prescription of Vitamin D and PMD gave flu shot 10 days ago, 2 days after Vitamiun D was taken, patient developed a rash. The rash is progressively worse, also reports rash worse after taking benadryl. PMD gave steorid taper. Feels more swollen, itchy    No N/V/D, fever, pregnancy  : [place]

## 2020-09-20 NOTE — ED PROVIDER NOTE - SKIN, MLM
diffuse macular papular blanching pleuritic rash, no mucosal involvement, palms and soles with no vesicles. Skin normal color for race, warm, dry and intact

## 2020-09-20 NOTE — ED ADULT NURSE NOTE - CHIEF COMPLAINT QUOTE
Patient arrived to ED today with c/o chest pain for three hours, throat pain for three hours, and feels like her throat is going numb for three hours. Patient here on the 9/16, 9/17, 9/18 to be seen in ED with similar symptoms.  Patient has rash to body.

## 2020-09-20 NOTE — ED PROVIDER NOTE - PROGRESS NOTE DETAILS
pt feels much better, rash on face and overall skin turgor appears better, pt advised to take med,s stop vitamin D

## 2020-09-20 NOTE — ED PROVIDER NOTE - NSFOLLOWUPINSTRUCTIONS_ED_ALL_ED_FT
1. TAKE MEDS AS PRESCRIBED  2. STOP PREDNISONE GIVE  BY YOUR PMD as its different dose  3. stop taking vit D tablets  4. f/u with allergy specialist  5. drink lost fo fluids  6. return promptly in c/o worsening symptoms

## 2020-09-20 NOTE — ED PROVIDER NOTE - PATIENT PORTAL LINK FT
You can access the FollowMyHealth Patient Portal offered by [facility] by registering at the following website: [email or web address]. By joining [employer]’s FollowMyHealth portal, you will also be able to view your health information using other applications (apps) compatible with our system.

## 2020-09-20 NOTE — ED ADULT NURSE REASSESSMENT NOTE - NS ED NURSE REASSESS COMMENT FT1
Patient states that she is feeling better at this time, slight improvement of rash noted. Airway intact

## 2020-09-20 NOTE — ED PROVIDER NOTE - NSFOLLOWUPCLINICS_GEN_ALL_ED_FT
[facility]  Allergy  [address] [place] [address]  Phone: [phone]  Fax:   Follow Up Time: 7-10 Days

## 2020-09-20 NOTE — ED ADULT NURSE NOTE - OBJECTIVE STATEMENT
30 year old female, PMHx of hypothyroid, present to the ED with a body wide red raised rash x 5 days. Patient states that she started taking a vitamin D pill, 3 days before the rash started. Patient states that she has been taking Benadryl for the rash with no relief. Patient states that she is having pain in her chest and is itchy. Patient states that this morning she felt tightness in her throat. Patient started on prednisone by a dermatologist yesterday.

## 2020-09-30 ENCOUNTER — APPOINTMENT (OUTPATIENT)
Dept: DERMATOLOGY | Facility: CLINIC | Age: 30
End: 2020-09-30

## 2021-07-01 NOTE — ED ADULT TRIAGE NOTE - INTERPRETER'S NAME
What Type Of Note Output Would You Prefer (Optional)?: Standard Output Hpi Title: Evaluation of Skin Lesions How Severe Are Your Spot(S)?: mild Have Your Spot(S) Been Treated In The Past?: has not been treated Additional History: \\nPatient was seen at the [place] [facility] in [place]. Patient was seen in June 2021.\\n\\nPatient also requesting refills of Fluocinonide, Clindamycin gel, and Cloderm. [place]

## 2021-07-19 PROBLEM — Z00.00 ENCOUNTER FOR PREVENTIVE HEALTH EXAMINATION: Status: ACTIVE | Noted: 2021-07-19

## 2022-11-07 NOTE — ED ADULT NURSE NOTE - CAS EDP DISCH TYPE
Home Rhomboid Transposition Flap Text: The defect edges were debeveled with a #15 scalpel blade.  Given the location of the defect and the proximity to free margins a rhomboid transposition flap was deemed most appropriate.  Using a sterile surgical marker, an appropriate rhomboid flap was drawn incorporating the defect.    The area thus outlined was incised deep to adipose tissue with a #15 scalpel blade.  The skin margins were undermined to an appropriate distance in all directions utilizing iris scissors.

## 2023-11-17 NOTE — ED ADULT NURSE NOTE - ED COMFORT CARE
Patient states she has been ill for a few weeks. Her symptoms include sore throat, cough, and yellow phlegm. She went to urgent care in October and was given an antibiotic that helped temporarily, but her symptoms have returned. She is wondering if Dr. Duarte could prescribe something. Please advise.    Patient informed

## 2023-11-23 NOTE — ED ADULT TRIAGE NOTE - TEMPERATURE IN CELSIUS (DEGREES C)
79-year-old female presenting for hematemesis and black tarry stools.  Upon initial presentation, vital stable, although hemoglobin decreased at 9 compared to baseline of around 15.  Was transfused 1 unit PRBC, and underwent EGD on 11/22/2023, with findings of multiple clean duodenal ulcers, with 1 oozing ulcer requiring epinephrine and clip was placed.  Pathology for H. pylori was obtained, pending.  Postprocedure, patient tolerated diet well, and vitals remained stable.  Continued on Protonix 40 mg twice daily for 8 weeks, and to follow-up with gastroenterology Dr. Arriola for repeat endoscopy and pathology reports. Instructed patient to refrain from taking steroids and NSAIDs.    # Duodenal ulcers  -H. pylori pathology pending  -Continue Protonix 40 mg twice daily for 8 weeks  -Follow-up with gastroenterology Dr. Arriola for repeat endoscopy  
36.8

## 2024-06-25 NOTE — ED ADULT NURSE NOTE - NS_NURSE_DISC_TEACHING_YN_ED_ALL_ED
Pharmacist Admission Medication History    Admission medication history is complete. The information provided in this note is only as accurate as the sources available at the time of the update.    Information Source(s): Patient via in-person    Pertinent Information: patient lives in assisted living and manages own meds - she has an appointment later in summer with [facility] - she thinks she needs help with managing the different doctors /different orders.  She also would like someone to help with the ordering of meds.  I told her we could put in a [facility] consult to see if she could get in sooner    Changes made to PTA medication list:  Added: latanoprost  Deleted: None  Changed: warfarin, torsemide    Allergies reviewed with patient and updates made in EHR: yes    Medication History Completed By: Daiana Puga formerly [facility] 6/25/2024 8:35 AM    PTA Med List   Medication Sig Last Dose    acetaminophen (TYLENOL) 500 MG tablet Take 1,000 mg by mouth every 8 hours as needed for pain Past Week    allopurinol (ZYLOPRIM) 100 MG tablet Take 1 tablet (100 mg) by mouth 2 times daily 6/24/2024 at am    amLODIPine (NORVASC) 5 MG tablet Take 1 tablet (5 mg) by mouth daily 6/24/2024 at am    amoxicillin (AMOXIL) 500 MG tablet Take 2000mg (4 tablets of 500mg each) approx 30 min to 1 hour prior to any dental procedures not recent    calcium citrate (CALCITRATE) 950 MG tablet Take 1 tablet by mouth daily  6/24/2024 at am    famotidine (PEPCID) 40 MG tablet Take 1 tablet (40 mg) by mouth daily 6/24/2024 at am    folic acid (FOLVITE) 1 MG tablet Take 1 mg by mouth daily 6/23/2024    latanoprost (XALATAN) 0.005 % ophthalmic solution Place 1 drop into both eyes every evening 6/23/2024    levothyroxine (SYNTHROID/LEVOTHROID) 112 MCG tablet Take 1 tablet (112 mcg) by mouth daily 6/24/2024 at am    methotrexate 50 MG/2ML injection Inject 0.8 mLs Subcutaneous every 7 days (Fridays) 6/21/2024    Metoprolol Tartrate 75 MG TABS TAKE 1 TABLET BY MOUTH  TWICE  DAILY 6/24/2024 at am    multivitamin w/minerals (THERA-VIT-M) tablet Take 1 tablet by mouth daily Without iron 6/23/2024    torsemide (DEMADEX) 20 MG tablet Take 20 mg by mouth 2 times daily 6/24/2024 at am    VITAMIN D, CHOLECALCIFEROL, PO Take 1,000 Units by mouth daily 6/24/2024    warfarin ANTICOAGULANT (COUMADIN) 2.5 MG tablet Take 2.5 mg by mouth daily 6/23/2024 at pm      No
